# Patient Record
Sex: FEMALE | Race: ASIAN | NOT HISPANIC OR LATINO | URBAN - METROPOLITAN AREA
[De-identification: names, ages, dates, MRNs, and addresses within clinical notes are randomized per-mention and may not be internally consistent; named-entity substitution may affect disease eponyms.]

---

## 2024-01-08 ENCOUNTER — OFFICE VISIT (OUTPATIENT)
Dept: URBAN - METROPOLITAN AREA CLINIC 124 | Facility: CLINIC | Age: 44
End: 2024-01-08
Payer: COMMERCIAL

## 2024-01-08 ENCOUNTER — TELEPHONE ENCOUNTER (OUTPATIENT)
Dept: URBAN - METROPOLITAN AREA CLINIC 96 | Facility: CLINIC | Age: 44
End: 2024-01-08

## 2024-01-08 ENCOUNTER — LAB OUTSIDE AN ENCOUNTER (OUTPATIENT)
Dept: URBAN - METROPOLITAN AREA CLINIC 124 | Facility: CLINIC | Age: 44
End: 2024-01-08

## 2024-01-08 VITALS
TEMPERATURE: 98.1 F | HEART RATE: 67 BPM | DIASTOLIC BLOOD PRESSURE: 87 MMHG | BODY MASS INDEX: 22.82 KG/M2 | HEIGHT: 62 IN | SYSTOLIC BLOOD PRESSURE: 137 MMHG | WEIGHT: 124 LBS

## 2024-01-08 DIAGNOSIS — D50.9 IRON DEFICIENCY ANEMIA, UNSPECIFIED IRON DEFICIENCY ANEMIA TYPE: ICD-10-CM

## 2024-01-08 DIAGNOSIS — Z12.11 COLON CANCER SCREENING: ICD-10-CM

## 2024-01-08 DIAGNOSIS — Z83.719 FAMILY HISTORY OF COLONIC POLYPS: ICD-10-CM

## 2024-01-08 DIAGNOSIS — Z85.3 HISTORY OF BREAST CANCER: ICD-10-CM

## 2024-01-08 DIAGNOSIS — Z90.10 HISTORY OF MASTECTOMY, UNSPECIFIED LATERALITY: ICD-10-CM

## 2024-01-08 PROBLEM — 87522002: Status: ACTIVE | Noted: 2024-01-08

## 2024-01-08 PROBLEM — 429087003: Status: ACTIVE | Noted: 2024-01-08

## 2024-01-08 PROCEDURE — 99204 OFFICE O/P NEW MOD 45 MIN: CPT | Performed by: INTERNAL MEDICINE

## 2024-01-08 RX ORDER — METHOCARBAMOL 750 MG/1
TAKE 1 TABLET BY MOUTH EVERY EIGHT HOURS AS NEEDED FOR MUSCLE SPAM TABLET, FILM COATED ORAL
Qty: 40 EACH | Refills: 0 | Status: ON HOLD | COMMUNITY

## 2024-01-08 RX ORDER — ONDANSETRON 4 MG/1
PLACE 1 TABLET ON THE TONGUE AND DISSOLVE BY MOUTH EVERY 8 HOURS AS NEEDED FOR NAUSEA AND VOMITING TABLET, ORALLY DISINTEGRATING ORAL
Qty: 18 EACH | Refills: 0 | COMMUNITY

## 2024-01-08 RX ORDER — OXYCODONE HYDROCHLORIDE 5 MG/1
TAKE 1 TABLET BY MOUTH EVERY 4 TO 6 HOURS AS NEEDED FOR PAIN TABLET ORAL
Qty: 30 EACH | Refills: 0 | COMMUNITY

## 2024-01-08 RX ORDER — ONDANSETRON HYDROCHLORIDE 4 MG/1
2 TABLETS TABLET, FILM COATED ORAL
Qty: 2 TABLETS | Refills: 0 | OUTPATIENT
Start: 2024-01-08

## 2024-01-08 RX ORDER — CEFADROXIL 500 MG/1
TAKE 1 CAPSULE BY MOUTH TWICE A DAY CAPSULE ORAL
Qty: 14 EACH | Refills: 0 | COMMUNITY

## 2024-01-08 RX ORDER — CEFADROXIL 500 MG/1
CAPSULE ORAL
Qty: 14 CAPSULE | COMMUNITY

## 2024-01-08 RX ORDER — TRETINOIN 0.5 MG/G
CREAM TOPICAL
Qty: 20 GRAM | Status: ON HOLD | COMMUNITY

## 2024-01-08 RX ORDER — SODIUM, POTASSIUM,MAG SULFATES 17.5-3.13G
AS DIRECTED SOLUTION, RECONSTITUTED, ORAL ORAL AS DIRECTED
Qty: 1 | Refills: 0 | OUTPATIENT
Start: 2024-01-08 | End: 2024-01-09

## 2024-01-08 RX ORDER — FERROUS SULFATE 325(65) MG
1 TABLET TABLET ORAL
Status: ACTIVE | COMMUNITY

## 2024-01-08 RX ORDER — TAMOXIFEN CITRATE 20 MG/1
TAKE 1 TABLET BY MOUTH EVERY DAY TABLET ORAL
Qty: 90 EACH | Refills: 0 | Status: ON HOLD | COMMUNITY

## 2024-01-08 RX ORDER — GABAPENTIN 300 MG/1
TAKE 1 CAPSULE BY MOUTH THREE TIMES A DAY CAPSULE ORAL
Qty: 40 EACH | Refills: 0 | Status: ON HOLD | COMMUNITY

## 2024-01-08 RX ORDER — GABAPENTIN 300 MG/1
CAPSULE ORAL
Qty: 40 CAPSULE | COMMUNITY

## 2024-01-08 RX ORDER — METHOCARBAMOL 750 MG/1
TABLET, FILM COATED ORAL
Qty: 40 TABLET | COMMUNITY

## 2024-01-08 RX ORDER — OXYCODONE HYDROCHLORIDE 5 MG/1
TABLET ORAL
Qty: 30 TABLET | Status: ON HOLD | COMMUNITY

## 2024-01-08 RX ORDER — CHROMIUM 200 MCG
1 TABLET TABLET ORAL ONCE A DAY
Status: ACTIVE | COMMUNITY

## 2024-01-08 RX ORDER — TRETINOIN 0.5 MG/G
APPLY TO AFFECTED AREA OF FACE EVERY OTHER NIGHT CREAM TOPICAL
Qty: 20 GRAM | Refills: 1 | COMMUNITY

## 2024-01-08 RX ORDER — ONDANSETRON 4 MG/1
TABLET, ORALLY DISINTEGRATING ORAL
Qty: 18 EACH | Status: ON HOLD | COMMUNITY

## 2024-01-08 RX ORDER — TAMOXIFEN CITRATE 20 MG/1
TABLET ORAL
Qty: 90 TABLET | COMMUNITY

## 2024-01-08 NOTE — HPI-TODAY'S VISIT:
42 yo fem ref by Dr Todd for colon ca screening for a gi consultation and a copy will be sent to the ref provider. No fam hx of colon cancer. Pt goes to bathroom daily. NO upper gi complaints. NO obvious bleeding noted. Pts dad had polyps.  Pt was dx with breast cancer in 2020- lumpectomy and radiation and tamoxifen given Cancer came back in april 2023- double mastectomy in July of 2023- had reconstruction Dec 11, 2023. Everything went well. - with 2 teen boys. 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 Pt is from [facility].  Pt sees Dr June Meza- [facility].

## 2024-01-12 ENCOUNTER — OUT OF OFFICE VISIT (OUTPATIENT)
Dept: URBAN - METROPOLITAN AREA SURGERY CENTER 18 | Facility: SURGERY CENTER | Age: 44
End: 2024-01-12
Payer: COMMERCIAL

## 2024-01-12 ENCOUNTER — OFFICE VISIT (OUTPATIENT)
Dept: URBAN - METROPOLITAN AREA SURGERY CENTER 18 | Facility: SURGERY CENTER | Age: 44
End: 2024-01-12

## 2024-01-12 DIAGNOSIS — Z12.11 COLON CANCER SCREENING (HIGH RISK): ICD-10-CM

## 2024-01-12 DIAGNOSIS — K29.60 ADENOPAPILLOMATOSIS GASTRICA: ICD-10-CM

## 2024-01-12 DIAGNOSIS — K63.89 OTHER SPECIFIED DISEASES OF INTESTINE: ICD-10-CM

## 2024-01-12 DIAGNOSIS — Z12.11 COLON CANCER SCREENING: ICD-10-CM

## 2024-01-12 DIAGNOSIS — K22.89 OTHER SPECIFIED DISEASE OF ESOPHAGUS: ICD-10-CM

## 2024-01-12 DIAGNOSIS — K31.A11 INTESTINAL METAPLASIA OF ANTRUM OF STOMACH WITHOUT DYSPLASIA: ICD-10-CM

## 2024-01-12 DIAGNOSIS — D50.8 OTHER IRON DEFICIENCY ANEMIA: ICD-10-CM

## 2024-01-12 DIAGNOSIS — D12.3 ADENOMATOUS POLYP OF TRANSVERSE COLON: ICD-10-CM

## 2024-01-12 DIAGNOSIS — K64.9 HEMORRHOIDS: ICD-10-CM

## 2024-01-12 PROCEDURE — 45380 COLONOSCOPY AND BIOPSY: CPT | Performed by: INTERNAL MEDICINE

## 2024-01-12 PROCEDURE — G8907 PT DOC NO EVENTS ON DISCHARG: HCPCS | Performed by: INTERNAL MEDICINE

## 2024-01-12 PROCEDURE — 43239 EGD BIOPSY SINGLE/MULTIPLE: CPT | Performed by: INTERNAL MEDICINE

## 2024-01-12 PROCEDURE — 00813 ANES UPR LWR GI NDSC PX: CPT | Performed by: NURSE ANESTHETIST, CERTIFIED REGISTERED

## 2024-01-12 RX ORDER — ONDANSETRON 4 MG/1
PLACE 1 TABLET ON THE TONGUE AND DISSOLVE BY MOUTH EVERY 8 HOURS AS NEEDED FOR NAUSEA AND VOMITING TABLET, ORALLY DISINTEGRATING ORAL
Qty: 18 EACH | Refills: 0 | COMMUNITY

## 2024-01-12 RX ORDER — CHROMIUM 200 MCG
1 TABLET TABLET ORAL ONCE A DAY
Status: ACTIVE | COMMUNITY

## 2024-01-12 RX ORDER — METHOCARBAMOL 750 MG/1
TAKE 1 TABLET BY MOUTH EVERY EIGHT HOURS AS NEEDED FOR MUSCLE SPAM TABLET, FILM COATED ORAL
Qty: 40 EACH | Refills: 0 | Status: ON HOLD | COMMUNITY

## 2024-01-12 RX ORDER — TRETINOIN 0.5 MG/G
APPLY TO AFFECTED AREA OF FACE EVERY OTHER NIGHT CREAM TOPICAL
Qty: 20 GRAM | Refills: 1 | COMMUNITY

## 2024-01-12 RX ORDER — CEFADROXIL 500 MG/1
TAKE 1 CAPSULE BY MOUTH TWICE A DAY CAPSULE ORAL
Qty: 14 EACH | Refills: 0 | COMMUNITY

## 2024-01-12 RX ORDER — TAMOXIFEN CITRATE 20 MG/1
TAKE 1 TABLET BY MOUTH EVERY DAY TABLET ORAL
Qty: 90 EACH | Refills: 0 | Status: ON HOLD | COMMUNITY

## 2024-01-12 RX ORDER — FERROUS SULFATE 325(65) MG
1 TABLET TABLET ORAL
Status: ACTIVE | COMMUNITY

## 2024-01-12 RX ORDER — GABAPENTIN 300 MG/1
TAKE 1 CAPSULE BY MOUTH THREE TIMES A DAY CAPSULE ORAL
Qty: 40 EACH | Refills: 0 | Status: ON HOLD | COMMUNITY

## 2024-01-12 RX ORDER — ONDANSETRON 4 MG/1
TABLET, ORALLY DISINTEGRATING ORAL
Qty: 18 EACH | Status: ON HOLD | COMMUNITY

## 2024-01-12 RX ORDER — METHOCARBAMOL 750 MG/1
TABLET, FILM COATED ORAL
Qty: 40 TABLET | COMMUNITY

## 2024-01-12 RX ORDER — ONDANSETRON HYDROCHLORIDE 4 MG/1
2 TABLETS TABLET, FILM COATED ORAL
Qty: 2 TABLETS | Refills: 0 | Status: ACTIVE | COMMUNITY
Start: 2024-01-08

## 2024-01-12 RX ORDER — CEFADROXIL 500 MG/1
CAPSULE ORAL
Qty: 14 CAPSULE | COMMUNITY

## 2024-01-12 RX ORDER — GABAPENTIN 300 MG/1
CAPSULE ORAL
Qty: 40 CAPSULE | COMMUNITY

## 2024-01-12 RX ORDER — OXYCODONE HYDROCHLORIDE 5 MG/1
TAKE 1 TABLET BY MOUTH EVERY 4 TO 6 HOURS AS NEEDED FOR PAIN TABLET ORAL
Qty: 30 EACH | Refills: 0 | COMMUNITY

## 2024-01-12 RX ORDER — TRETINOIN 0.5 MG/G
CREAM TOPICAL
Qty: 20 GRAM | Status: ON HOLD | COMMUNITY

## 2024-01-12 RX ORDER — OXYCODONE HYDROCHLORIDE 5 MG/1
TABLET ORAL
Qty: 30 TABLET | Status: ON HOLD | COMMUNITY

## 2024-01-12 RX ORDER — TAMOXIFEN CITRATE 20 MG/1
TABLET ORAL
Qty: 90 TABLET | COMMUNITY

## 2024-01-16 ENCOUNTER — OFFICE VISIT (OUTPATIENT)
Dept: URBAN - METROPOLITAN AREA SURGERY CENTER 16 | Facility: SURGERY CENTER | Age: 44
End: 2024-01-16

## 2024-01-22 ENCOUNTER — OFFICE VISIT (OUTPATIENT)
Dept: URBAN - METROPOLITAN AREA CLINIC 124 | Facility: CLINIC | Age: 44
End: 2024-01-22

## 2024-02-16 ENCOUNTER — LAB (OUTPATIENT)
Dept: URBAN - METROPOLITAN AREA TELEHEALTH 2 | Facility: TELEHEALTH | Age: 44
End: 2024-02-16

## 2024-02-16 PROBLEM — 428283002: Status: ACTIVE | Noted: 2024-02-16

## 2024-02-19 ENCOUNTER — TELEP (OUTPATIENT)
Dept: URBAN - METROPOLITAN AREA TELEHEALTH 2 | Facility: TELEHEALTH | Age: 44
End: 2024-02-19
Payer: COMMERCIAL

## 2024-02-19 VITALS — BODY MASS INDEX: 22.82 KG/M2 | WEIGHT: 124 LBS | HEIGHT: 62 IN

## 2024-02-19 DIAGNOSIS — K31.A0 GASTRIC INTESTINAL METAPLASIA: ICD-10-CM

## 2024-02-19 DIAGNOSIS — Z86.010 PERSONAL HISTORY OF COLONIC POLYPS: ICD-10-CM

## 2024-02-19 DIAGNOSIS — D50.9 ANEMIA: ICD-10-CM

## 2024-02-19 DIAGNOSIS — Z83.719 FAMILY HISTORY OF COLONIC POLYPS: ICD-10-CM

## 2024-02-19 PROCEDURE — 99204 OFFICE O/P NEW MOD 45 MIN: CPT | Performed by: INTERNAL MEDICINE

## 2024-02-19 RX ORDER — FERROUS SULFATE 325(65) MG
1 TABLET TABLET ORAL
COMMUNITY

## 2024-02-19 RX ORDER — OXYCODONE HYDROCHLORIDE 5 MG/1
TABLET ORAL
Qty: 30 TABLET | COMMUNITY

## 2024-02-19 RX ORDER — TAMOXIFEN CITRATE 20 MG/1
TAKE 1 TABLET BY MOUTH EVERY DAY TABLET ORAL
Qty: 90 EACH | Refills: 0 | COMMUNITY

## 2024-02-19 RX ORDER — METHOCARBAMOL 750 MG/1
TABLET, FILM COATED ORAL
Qty: 40 TABLET | COMMUNITY

## 2024-02-19 RX ORDER — TRETINOIN 0.5 MG/G
CREAM TOPICAL
Qty: 20 GRAM | COMMUNITY

## 2024-02-19 RX ORDER — OXYCODONE HYDROCHLORIDE 5 MG/1
TAKE 1 TABLET BY MOUTH EVERY 4 TO 6 HOURS AS NEEDED FOR PAIN TABLET ORAL
Qty: 30 EACH | Refills: 0 | COMMUNITY

## 2024-02-19 RX ORDER — GABAPENTIN 300 MG/1
CAPSULE ORAL
Qty: 40 CAPSULE | COMMUNITY

## 2024-02-19 RX ORDER — CHROMIUM 200 MCG
1 TABLET TABLET ORAL ONCE A DAY
COMMUNITY

## 2024-02-19 RX ORDER — TRETINOIN 0.5 MG/G
APPLY TO AFFECTED AREA OF FACE EVERY OTHER NIGHT CREAM TOPICAL
Qty: 20 GRAM | Refills: 1 | COMMUNITY

## 2024-02-19 RX ORDER — TAMOXIFEN CITRATE 20 MG/1
TABLET ORAL
Qty: 90 TABLET | COMMUNITY

## 2024-02-19 RX ORDER — ONDANSETRON 4 MG/1
PLACE 1 TABLET ON THE TONGUE AND DISSOLVE BY MOUTH EVERY 8 HOURS AS NEEDED FOR NAUSEA AND VOMITING TABLET, ORALLY DISINTEGRATING ORAL
Qty: 18 EACH | Refills: 0 | COMMUNITY

## 2024-02-19 RX ORDER — CEFADROXIL 500 MG/1
TAKE 1 CAPSULE BY MOUTH TWICE A DAY CAPSULE ORAL
Qty: 14 EACH | Refills: 0 | COMMUNITY

## 2024-02-19 RX ORDER — METHOCARBAMOL 750 MG/1
TAKE 1 TABLET BY MOUTH EVERY EIGHT HOURS AS NEEDED FOR MUSCLE SPAM TABLET, FILM COATED ORAL
Qty: 40 EACH | Refills: 0 | COMMUNITY

## 2024-02-19 RX ORDER — CEFADROXIL 500 MG/1
CAPSULE ORAL
Qty: 14 CAPSULE | COMMUNITY

## 2024-02-19 RX ORDER — ONDANSETRON 4 MG/1
TABLET, ORALLY DISINTEGRATING ORAL
Qty: 18 EACH | COMMUNITY

## 2024-02-19 RX ORDER — GABAPENTIN 300 MG/1
TAKE 1 CAPSULE BY MOUTH THREE TIMES A DAY CAPSULE ORAL
Qty: 40 EACH | Refills: 0 | COMMUNITY

## 2024-02-19 NOTE — HPI-TODAY'S VISIT:
44 yo fem ref by Dr Todd for colon ca screening for a gi consultation and a copy will be sent to the ref provider. No fam hx of colon cancer. Pt goes to bathroom daily. NO upper gi complaints. NO obvious bleeding noted. Pts dad had polyps.  Pt was dx with breast cancer in 2020- lumpectomy and radiation and tamoxifen given Cancer came back in april 2023- double mastectomy in July of 2023- had reconstruction Dec 11, 2023. Everything went well. - with 2 teen boys. 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  Pt sees Dr June Meza- [facility]. Patient had EGD and colonoscopy on January 12, 2024.  EGD showed no gross abnormalities but biopsies were sent.  Colonoscopy showed a 5 mm transverse colon polyp that was removed and small internal hemorrhoids otherwise normal exam.  Pathology showed no evidence of celiac, slight chronic gastritis with focal intestinal metaplasia of the antrum but no H. pylori seen.  Esophageal biopsy showed slight chronic inflammation and the polyp removed in the transverse colon was hyperplastic.  Because of her history of anemia I recommended repeat colonoscopy in 5 years or January 2029.

## 2024-03-11 ENCOUNTER — PILLCAM (OUTPATIENT)
Dept: URBAN - METROPOLITAN AREA CLINIC 95 | Facility: CLINIC | Age: 44
End: 2024-03-11
Payer: COMMERCIAL

## 2024-03-11 DIAGNOSIS — D50.9 ANEMIA: ICD-10-CM

## 2024-03-11 PROCEDURE — 91110 GI TRC IMG INTRAL ESOPH-ILE: CPT | Performed by: INTERNAL MEDICINE

## 2024-04-30 ENCOUNTER — COL/EGD (OUTPATIENT)
Dept: URBAN - METROPOLITAN AREA SURGERY CENTER 16 | Facility: SURGERY CENTER | Age: 44
End: 2024-04-30

## 2024-05-13 ENCOUNTER — OFFICE VISIT (OUTPATIENT)
Dept: URBAN - METROPOLITAN AREA TELEHEALTH 2 | Facility: TELEHEALTH | Age: 44
End: 2024-05-13

## 2024-12-30 ENCOUNTER — DASHBOARD ENCOUNTERS (OUTPATIENT)
Age: 44
End: 2024-12-30

## 2025-01-06 ENCOUNTER — LAB OUTSIDE AN ENCOUNTER (OUTPATIENT)
Dept: URBAN - METROPOLITAN AREA CLINIC 124 | Facility: CLINIC | Age: 45
End: 2025-01-06

## 2025-01-06 ENCOUNTER — OFFICE VISIT (OUTPATIENT)
Dept: URBAN - METROPOLITAN AREA CLINIC 124 | Facility: CLINIC | Age: 45
End: 2025-01-06
Payer: COMMERCIAL

## 2025-01-06 VITALS
HEART RATE: 54 BPM | BODY MASS INDEX: 22.63 KG/M2 | TEMPERATURE: 97 F | SYSTOLIC BLOOD PRESSURE: 127 MMHG | WEIGHT: 123 LBS | HEIGHT: 62 IN | DIASTOLIC BLOOD PRESSURE: 75 MMHG

## 2025-01-06 DIAGNOSIS — Z86.0102 PERSONAL HISTORY OF HYPERPLASTIC COLON POLYPS: ICD-10-CM

## 2025-01-06 DIAGNOSIS — Z83.719 FAMILY HISTORY OF COLONIC POLYPS: ICD-10-CM

## 2025-01-06 DIAGNOSIS — D50.9 IRON DEFICIENCY ANEMIA, UNSPECIFIED IRON DEFICIENCY ANEMIA TYPE: ICD-10-CM

## 2025-01-06 DIAGNOSIS — Z90.10 HISTORY OF MASTECTOMY, UNSPECIFIED LATERALITY: ICD-10-CM

## 2025-01-06 DIAGNOSIS — K31.A0 GASTRIC INTESTINAL METAPLASIA: ICD-10-CM

## 2025-01-06 DIAGNOSIS — Z85.3 HISTORY OF BREAST CANCER: ICD-10-CM

## 2025-01-06 PROCEDURE — 99204 OFFICE O/P NEW MOD 45 MIN: CPT | Performed by: INTERNAL MEDICINE

## 2025-01-06 RX ORDER — FERROUS SULFATE 325(65) MG
1 TABLET TABLET ORAL
Status: DISCONTINUED | COMMUNITY

## 2025-01-06 RX ORDER — OXYCODONE HYDROCHLORIDE 5 MG/1
TABLET ORAL
Qty: 30 TABLET | Status: DISCONTINUED | COMMUNITY

## 2025-01-06 RX ORDER — METHOCARBAMOL 750 MG/1
TAKE 1 TABLET BY MOUTH EVERY EIGHT HOURS AS NEEDED FOR MUSCLE SPAM TABLET, FILM COATED ORAL
Qty: 40 EACH | Refills: 0 | Status: DISCONTINUED | COMMUNITY

## 2025-01-06 RX ORDER — GABAPENTIN 300 MG/1
CAPSULE ORAL
Qty: 40 CAPSULE | Status: DISCONTINUED | COMMUNITY

## 2025-01-06 RX ORDER — METHOCARBAMOL 750 MG/1
TABLET, FILM COATED ORAL
Qty: 40 TABLET | Status: DISCONTINUED | COMMUNITY

## 2025-01-06 RX ORDER — TAMOXIFEN CITRATE 20 MG/1
TAKE 1 TABLET BY MOUTH EVERY DAY TABLET ORAL
Qty: 90 EACH | Refills: 0 | Status: DISCONTINUED | COMMUNITY

## 2025-01-06 RX ORDER — CHROMIUM 200 MCG
1 TABLET TABLET ORAL ONCE A DAY
Status: DISCONTINUED | COMMUNITY

## 2025-01-06 RX ORDER — CEFADROXIL 500 MG/1
CAPSULE ORAL
Qty: 14 CAPSULE | Status: DISCONTINUED | COMMUNITY

## 2025-01-06 RX ORDER — ONDANSETRON 4 MG/1
TABLET, ORALLY DISINTEGRATING ORAL
Qty: 18 EACH | Status: DISCONTINUED | COMMUNITY

## 2025-01-06 RX ORDER — GABAPENTIN 300 MG/1
TAKE 1 CAPSULE BY MOUTH THREE TIMES A DAY CAPSULE ORAL
Qty: 40 EACH | Refills: 0 | Status: DISCONTINUED | COMMUNITY

## 2025-01-06 NOTE — HPI-TODAY'S VISIT:
45 yo fem ref by Dr Todd ([facility]) for a GI fu and a copy will be sent to the ref provider. No fam hx of colon cancer. Pt goes to bathroom daily. NO upper gi complaints. NO obvious bleeding noted. Pts dad had polyps.  Pt was dx with breast cancer in 2020- lumpectomy and radiation and tamoxifen given Cancer came back in april 2023- double mastectomy in July of 2023- had reconstruction Dec 11, 2023. Everything went well. 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  Pt sees Dr June Meza- [facility]. Patient had EGD and colonoscopy on January 12, 2024.  EGD showed no gross abnormalities but biopsies were sent.  Colonoscopy showed a 5 mm transverse colon polyp that was removed and small internal hemorrhoids otherwise normal exam.  Pathology showed no evidence of celiac, slight chronic gastritis with focal intestinal metaplasia of the antrum but no H. pylori seen.  Esophageal biopsy showed slight chronic inflammation and the polyp removed in the transverse colon was hyperplastic.  Because of her history of anemia I recommended repeat colonoscopy in 5 years or January 2029. pt had a normal pillcam in 3/2024. Pt sees Dr Mala Meza (oncologist- [facility]). Pt has been on iv iron with her and no longer takes oral iron. Pt has no issues with her bowels.

## 2025-02-18 ENCOUNTER — CLAIMS CREATED FROM THE CLAIM WINDOW (OUTPATIENT)
Dept: URBAN - METROPOLITAN AREA SURGERY CENTER 16 | Facility: SURGERY CENTER | Age: 45
End: 2025-02-18
Payer: COMMERCIAL

## 2025-02-18 ENCOUNTER — CLAIMS CREATED FROM THE CLAIM WINDOW (OUTPATIENT)
Dept: URBAN - METROPOLITAN AREA CLINIC 4 | Facility: CLINIC | Age: 45
End: 2025-02-18
Payer: COMMERCIAL

## 2025-02-18 DIAGNOSIS — K29.70 GASTRITIS, UNSPECIFIED, WITHOUT BLEEDING: ICD-10-CM

## 2025-02-18 DIAGNOSIS — K31.89 REACTIVE GASTROPATHY: ICD-10-CM

## 2025-02-18 DIAGNOSIS — D50.9 ANEMIA, IRON DEFICIENCY: ICD-10-CM

## 2025-02-18 DIAGNOSIS — K31.89 OTHER DISEASES OF STOMACH AND DUODENUM: ICD-10-CM

## 2025-02-18 DIAGNOSIS — K29.70 GASTRITIS: ICD-10-CM

## 2025-02-18 PROCEDURE — 00731 ANES UPR GI NDSC PX NOS: CPT | Performed by: NURSE ANESTHETIST, CERTIFIED REGISTERED

## 2025-02-18 PROCEDURE — 00731 ANES UPR GI NDSC PX NOS: CPT | Performed by: ANESTHESIOLOGY

## 2025-02-18 PROCEDURE — 43239 EGD BIOPSY SINGLE/MULTIPLE: CPT | Performed by: INTERNAL MEDICINE

## 2025-02-18 PROCEDURE — 88342 IMHCHEM/IMCYTCHM 1ST ANTB: CPT | Performed by: PATHOLOGY

## 2025-02-18 PROCEDURE — 88305 TISSUE EXAM BY PATHOLOGIST: CPT | Performed by: PATHOLOGY

## 2025-03-10 ENCOUNTER — OFFICE VISIT (OUTPATIENT)
Dept: URBAN - METROPOLITAN AREA TELEHEALTH 2 | Facility: TELEHEALTH | Age: 45
End: 2025-03-10

## 2025-03-10 ENCOUNTER — OFFICE VISIT (OUTPATIENT)
Dept: URBAN - METROPOLITAN AREA TELEHEALTH 2 | Facility: TELEHEALTH | Age: 45
End: 2025-03-10
Payer: COMMERCIAL

## 2025-03-10 VITALS — HEIGHT: 62 IN

## 2025-03-10 DIAGNOSIS — Z86.0102 PERSONAL HISTORY OF HYPERPLASTIC COLON POLYPS: ICD-10-CM

## 2025-03-10 DIAGNOSIS — D50.9 IRON DEFICIENCY ANEMIA, UNSPECIFIED IRON DEFICIENCY ANEMIA TYPE: ICD-10-CM

## 2025-03-10 DIAGNOSIS — Z85.3 HISTORY OF BREAST CANCER: ICD-10-CM

## 2025-03-10 DIAGNOSIS — Z83.719 FAMILY HISTORY OF COLONIC POLYPS: ICD-10-CM

## 2025-03-10 DIAGNOSIS — K31.A0 GASTRIC INTESTINAL METAPLASIA: ICD-10-CM

## 2025-03-10 DIAGNOSIS — Z90.10 HISTORY OF MASTECTOMY, UNSPECIFIED LATERALITY: ICD-10-CM

## 2025-03-10 PROCEDURE — 99212 OFFICE O/P EST SF 10 MIN: CPT

## 2025-03-10 NOTE — HPI-TODAY'S VISIT:
Patient is a 45 yo fem ref by Dr Todd ([facility]) for a GI fu and a copy will be sent to the ref provider. Previously seen by Dr. Rasmussen  1/06/25 No fam hx of colon cancer. Pt goes to bathroom daily. NO upper gi complaints. NO obvious bleeding noted. Pts dad had polyps.  Pt was dx with breast cancer in 2020- lumpectomy and radiation and tamoxifen given Cancer came back in april 2023- double mastectomy in July of 2023- had reconstruction Dec 11, 2023. Everything went well. 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  Pt sees Dr June Meza- [facility].  Patient had EGD and colonoscopy on January 12, 2024.  EGD showed no gross abnormalities but biopsies were sent.  Colonoscopy showed a 5 mm transverse colon polyp that was removed and small internal hemorrhoids otherwise normal exam.  Pathology showed no evidence of celiac, slight chronic gastritis with focal intestinal metaplasia of the antrum but no H. pylori seen.  Esophageal biopsy showed slight chronic inflammation and the polyp removed in the transverse colon was hyperplastic.  Because of her history of anemia I recommended repeat colonoscopy in 5 years or January 2029. pt had a normal pillcam in 3/2024. Pt sees Dr Mala Meza (oncologist- [facility]). Pt has been on iv iron with her and no longer takes oral iron. Pt has no issues with her bowels.  EGD 2/18/25 with Dr. Rasmussen revealed erythematous mucosa in antrum, less curvature, normal incisura bx reveal mild gastropathy, neg. for H. pylori or IM.  Today, via telehealth, patient reports she feels well. Denies upper GI issues. Denies abdominal pain, diarrhea, constipation, hematochezia, melena, or constitutional symptoms. Patient sees her oncologist twice yearly.